# Patient Record
Sex: FEMALE | Race: WHITE
[De-identification: names, ages, dates, MRNs, and addresses within clinical notes are randomized per-mention and may not be internally consistent; named-entity substitution may affect disease eponyms.]

---

## 2019-10-11 ENCOUNTER — HOSPITAL ENCOUNTER (EMERGENCY)
Dept: HOSPITAL 62 - ER | Age: 29
Discharge: HOME | End: 2019-10-11
Payer: COMMERCIAL

## 2019-10-11 VITALS — SYSTOLIC BLOOD PRESSURE: 130 MMHG | DIASTOLIC BLOOD PRESSURE: 81 MMHG

## 2019-10-11 DIAGNOSIS — R10.13: Primary | ICD-10-CM

## 2019-10-11 DIAGNOSIS — R11.2: ICD-10-CM

## 2019-10-11 LAB
ADD MANUAL DIFF: NO
ALBUMIN SERPL-MCNC: 4.3 G/DL (ref 3.5–5)
ALP SERPL-CCNC: 76 U/L (ref 38–126)
ANION GAP SERPL CALC-SCNC: 11 MMOL/L (ref 5–19)
APPEARANCE UR: CLEAR
APTT PPP: YELLOW S
AST SERPL-CCNC: 20 U/L (ref 14–36)
BASOPHILS # BLD AUTO: 0 10^3/UL (ref 0–0.2)
BASOPHILS NFR BLD AUTO: 0.2 % (ref 0–2)
BILIRUB DIRECT SERPL-MCNC: 0 MG/DL (ref 0–0.4)
BILIRUB SERPL-MCNC: 0.9 MG/DL (ref 0.2–1.3)
BILIRUB UR QL STRIP: NEGATIVE
BUN SERPL-MCNC: 14 MG/DL (ref 7–20)
CALCIUM: 9.1 MG/DL (ref 8.4–10.2)
CHLORIDE SERPL-SCNC: 104 MMOL/L (ref 98–107)
CO2 SERPL-SCNC: 23 MMOL/L (ref 22–30)
EOSINOPHIL # BLD AUTO: 0 10^3/UL (ref 0–0.6)
EOSINOPHIL NFR BLD AUTO: 0.1 % (ref 0–6)
ERYTHROCYTE [DISTWIDTH] IN BLOOD BY AUTOMATED COUNT: 12.4 % (ref 11.5–14)
GLUCOSE SERPL-MCNC: 112 MG/DL (ref 75–110)
GLUCOSE UR STRIP-MCNC: NEGATIVE MG/DL
HCT VFR BLD CALC: 41.3 % (ref 36–47)
HGB BLD-MCNC: 14.3 G/DL (ref 12–15.5)
KETONES UR STRIP-MCNC: 80 MG/DL
LYMPHOCYTES # BLD AUTO: 0.7 10^3/UL (ref 0.5–4.7)
LYMPHOCYTES NFR BLD AUTO: 10.2 % (ref 13–45)
MCH RBC QN AUTO: 31.3 PG (ref 27–33.4)
MCHC RBC AUTO-ENTMCNC: 34.6 G/DL (ref 32–36)
MCV RBC AUTO: 91 FL (ref 80–97)
MONOCYTES # BLD AUTO: 0.2 10^3/UL (ref 0.1–1.4)
MONOCYTES NFR BLD AUTO: 3.4 % (ref 3–13)
NEUTROPHILS # BLD AUTO: 5.9 10^3/UL (ref 1.7–8.2)
NEUTS SEG NFR BLD AUTO: 86.1 % (ref 42–78)
NITRITE UR QL STRIP: NEGATIVE
PH UR STRIP: 5 [PH] (ref 5–9)
PLATELET # BLD: 307 10^3/UL (ref 150–450)
POTASSIUM SERPL-SCNC: 3.8 MMOL/L (ref 3.6–5)
PROT SERPL-MCNC: 7.7 G/DL (ref 6.3–8.2)
PROT UR STRIP-MCNC: NEGATIVE MG/DL
RBC # BLD AUTO: 4.56 10^6/UL (ref 3.72–5.28)
SP GR UR STRIP: 1.03
TOTAL CELLS COUNTED % (AUTO): 100 %
UROBILINOGEN UR-MCNC: NEGATIVE MG/DL (ref ?–2)
WBC # BLD AUTO: 6.9 10^3/UL (ref 4–10.5)

## 2019-10-11 PROCEDURE — 36415 COLL VENOUS BLD VENIPUNCTURE: CPT

## 2019-10-11 PROCEDURE — 85025 COMPLETE CBC W/AUTO DIFF WBC: CPT

## 2019-10-11 PROCEDURE — 80053 COMPREHEN METABOLIC PANEL: CPT

## 2019-10-11 PROCEDURE — 96374 THER/PROPH/DIAG INJ IV PUSH: CPT

## 2019-10-11 PROCEDURE — 83690 ASSAY OF LIPASE: CPT

## 2019-10-11 PROCEDURE — 96375 TX/PRO/DX INJ NEW DRUG ADDON: CPT

## 2019-10-11 PROCEDURE — 96361 HYDRATE IV INFUSION ADD-ON: CPT

## 2019-10-11 PROCEDURE — 76705 ECHO EXAM OF ABDOMEN: CPT

## 2019-10-11 PROCEDURE — 99284 EMERGENCY DEPT VISIT MOD MDM: CPT

## 2019-10-11 PROCEDURE — 81001 URINALYSIS AUTO W/SCOPE: CPT

## 2019-10-11 PROCEDURE — 81025 URINE PREGNANCY TEST: CPT

## 2019-10-11 NOTE — ER DOCUMENT REPORT
ED Medical Screen (RME)





- General


Chief Complaint: Abdominal Pain


Stated Complaint: DIFFICULTY BREATHING


Time Seen by Provider: 10/11/19 12:23


Primary Care Provider: 


GEORGI NGO MD [Primary Care Provider] - Follow up as needed





- [facility]


Notes: 





10/11/19 12:25


Patient is a 29-year-old female no significant past medical history or surgical 

history who presents complaining of epigastric abdominal pain that started this 

morning with associated nausea and vomiting.  She is urinating normally and 

having normal bowel movements.  Denies drug allergies.  Pain does not radiate.  

No fever.





I have treated and performed a rapid initial assessment of this patient.  A 

comprehensive ED assessment and evaluation of the patient, analysis of test 

results and completion of medical decision making process will be conducted by 

additional ED providers.





PHYSICAL EXAMINATION:





GENERAL: Well-appearing, well-nourished and in no acute distress.  





- Related Data


Allergies/Adverse Reactions: 


                                        





No Known Allergies Allergy (Unverified 10/11/19 12:23)


   











Past Medical History





- Social History


Chew tobacco use (# tins/day): No


Frequency of alcohol use: None


Drug Abuse: None





Physical Exam





- Vital signs


Vitals: 





                                        











Temp Pulse Resp BP Pulse Ox


 


 97.8 F   63   32 H  145/88 H  100 


 


 10/11/19 12:11  10/11/19 12:11  10/11/19 12:11  10/11/19 12:11  10/11/19 12:11














Course





- Vital Signs


Vital signs: 





                                        











Temp Pulse Resp BP Pulse Ox


 


 97.8 F   63   32 H  145/88 H  100 


 


 10/11/19 12:11  10/11/19 12:11  10/11/19 12:11  10/11/19 12:11  10/11/19 12:11














Doctor's Discharge





- Discharge


Referrals: 


GEORGI NGO MD [Primary Care Provider] - Follow up as needed

## 2019-10-11 NOTE — ER DOCUMENT REPORT
ED General





- General


Chief Complaint: Abdominal Pain


Stated Complaint: DIFFICULTY BREATHING


Time Seen by Provider: 10/11/19 12:23


Primary Care Provider: 


GEORGI NGO MD [EMERITUS] - Follow up in 3-5 days


Mode of Arrival: Ambulatory


Information source: Patient


Notes: 





29-year-old female presents emergency department with complaints of epigastric 

abdominal pain and nausea vomiting that started this morning.  Reports history 

of reflux.  Denies fever or diarrhea.  Denies trauma.  Denies pain with void.





- HPI


Onset: This morning


Onset/Duration: Sudden


Associated symptoms: Nausea, Vomiting


Exacerbated by: Denies


Relieved by: Denies


Similar symptoms previously: No


Recently seen / treated by doctor: No





- Related Data


Allergies/Adverse Reactions: 


                                        





No Known Allergies Allergy (Unverified 10/11/19 12:23)


   











Past Medical History





- General


Information source: Patient


Last Menstrual Period: Current





- Social History


Smoking Status: Never Smoker


Chew tobacco use (# tins/day): No


Frequency of alcohol use: None


Drug Abuse: None


Lives with: Family


Family History: None


Patient has suicidal ideation: No


Patient has homicidal ideation: No





- Medical History


Medical History: Negative


Surgical Hx: Negative





Review of Systems





- Review of Systems


Notes: 





Review HPI for review of systems., All other systems negative





Physical Exam





- Vital signs


Vitals: 


                                        











Temp Pulse Resp BP Pulse Ox


 


 97.8 F   63   32 H  145/88 H  100 


 


 10/11/19 12:11  10/11/19 12:11  10/11/19 12:11  10/11/19 12:11  10/11/19 12:11














- Notes


Notes: 





PHYSICAL EXAMINATION: 


GENERAL: Well-appearing and in no acute distress 


HEAD: Atraumatic, normocephalic. 


EYES: Pupils equal round and reactive to light, extraocular movements intact, 

sclera anicteric, conjunctiva are normal. 


ENT: nares patent, oropharynx clear without exudates. Moist mucous membranes. 


NECK: Normal range of motion, supple without lymphadenopathy 


LUNGS: CTAB and equal. No wheezes rales or rhonchi. 


HEART: Regular rate and rhythm without murmurs 


ABDOMEN: Soft, epigastric tenderness. No guarding, no rebound 


BACK: Denies pain, denies flank pain


EXTREMITIES: Normal range of motion,  


NEUROLOGICAL: Cranial nerves grossly intact.  


PSYCH: Normal mood, normal affect. 


SKIN: Warm, Dry, normal turgor, no rashes or lesions noted








- Abdominal


Inspection: Normal


Distension: No distension


Bowel sounds: Normal


Tenderness: Tender - epigastric


Organomegaly: No organomegaly





Course





- Re-evaluation


Re-evalutation: 





10/11/19 15:35


29-year-old female that woke up with abdominal pain this a.m. with some nausea 

and vomiting.  Reports she ate chicken and potatoes last night for dinner.  

Reports that she does have a history of reflux.


Labs are unremarkable , will do gallbladder ultrasound to evaluate.





gallbladder unremarkable, pt is no longer vomiting, drinking po.  The patient 

and her family were instructed on all results.  Prescribe Zofran to go.  In

structed to follow-up with primary care provider for evaluation within 1 week.  

She verbalized understanding to all instructions.











                                        





                                 10/11/19 12:40 





                                 10/11/19 12:40 





                                        











MCV  91 fl (80-97)   10/11/19  12:40    


 


MCH  31.3 pg (27.0-33.4)   10/11/19  12:40    


 


MCHC  34.6 g/dL (32.0-36.0)   10/11/19  12:40    


 


RDW  12.4 % (11.5-14.0)   10/11/19  12:40    


 


Seg Neutrophils %  86.1 % (42-78)  H  10/11/19  12:40    


 


Chloride  104 mmol/L ()   10/11/19  12:40    


 


Carbon Dioxide  23 mmol/L (22-30)   10/11/19  12:40    


 


Anion Gap  11  (5-19)   10/11/19  12:40    


 


Est GFR ( Amer)  > 60  (>60)   10/11/19  12:40    


 


Glucose  112 mg/dL ()  H  10/11/19  12:40    


 


Calcium  9.1 mg/dL (8.4-10.2)   10/11/19  12:40    


 


Total Bilirubin  0.9 mg/dL (0.2-1.3)   10/11/19  12:40    


 


AST  20 U/L (14-36)   10/11/19  12:40    


 


Alkaline Phosphatase  76 U/L ()   10/11/19  12:40    


 


Total Protein  7.7 g/dL (6.3-8.2)   10/11/19  12:40    


 


Albumin  4.3 g/dL (3.5-5.0)   10/11/19  12:40    


 


Lipase  59.8 U/L ()   10/11/19  12:40    


 


Urine Color  YELLOW   10/11/19  12:20    


 


Urine Appearance  CLEAR   10/11/19  12:20    


 


Urine pH  5.0  (5.0-9.0)   10/11/19  12:20    


 


Ur Specific Gravity  1.030   10/11/19  12:20    


 


Urine Protein  NEGATIVE mg/dL (NEGATIVE)   10/11/19  12:20    


 


Urine Glucose (UA)  NEGATIVE mg/dL (NEGATIVE)   10/11/19  12:20    


 


Urine Ketones  80 mg/dL (NEGATIVE)  H  10/11/19  12:20    


 


Urine Blood  SMALL  (NEGATIVE)  H  10/11/19  12:20    


 


Urine Nitrite  NEGATIVE  (NEGATIVE)   10/11/19  12:20    


 


Ur Leukocyte Esterase  NEGATIVE  (NEGATIVE)   10/11/19  12:20    


 


Urine WBC (Auto)  1 /HPF  10/11/19  12:20    


 


Urine RBC (Auto)  1 /HPF  10/11/19  12:20    














                                        





Abdomen Ultrasound  10/11/19 14:49


IMPRESSION:  Unremarkable right upper quadrant ultrasound.


 











10/11/19 18:22








- Vital Signs


Vital signs: 


                                        











Temp Pulse Resp BP Pulse Ox


 


 98.0 F   64   32 H  130/81 H  100 


 


 10/11/19 17:02  10/11/19 17:02  10/11/19 12:11  10/11/19 17:02  10/11/19 17:02














- Laboratory


Result Diagrams: 


                                 10/11/19 12:40





                                 10/11/19 12:40


Laboratory results interpreted by me: 


                                        











  10/11/19 10/11/19 10/11/19





  12:20 12:40 12:40


 


Lymph % (Auto)   10.2 L 


 


Seg Neutrophils %   86.1 H 


 


Glucose    112 H


 


Urine Ketones  80 H  


 


Urine Blood  SMALL H  














- Diagnostic Test


Radiology reviewed: Image reviewed, Reports reviewed





Discharge





- Discharge


Clinical Impression: 


Abdominal pain


Qualifiers:


 Abdominal location: epigastric Qualified Code(s): R10.13 - Epigastric pain





Nausea & vomiting


Qualifiers:


 Vomiting type: unspecified Vomiting Intractability: non-intractable Qualified 

Code(s): R11.2 - Nausea with vomiting, unspecified





Disposition: HOME, SELF-CARE


Instructions:  Abdominal Pain ([facility]), Antinausea Medication ([facility]), Vomiting ([facility])


Additional Instructions: 


*You have been evaluated for abdominal pain, nausea and vomiting


*Here ultrasound was negative for gallstones, your work-up was unremarkable


*Take medication as prescribed for nausea


*Follow up with a primary care provider within one week for recheck


*Return to ED for worsening condition, changes, needs, concerns increasing abd

ominal pain


*Return to ED if not better in 24 hours














Monitor your blood pressure. Your blood pressure was elevated today.  This may 

be because you were anxious, in pain or because you need medication.  It is 

important to follow up with your primary care provider for full evaluation.


Forms:  Elevated Blood Pressure


Referrals: 


GEORGI NGO MD [EMERITUS] - Follow up in 3-5 days

## 2019-10-11 NOTE — RADIOLOGY REPORT (SQ)
EXAM DESCRIPTION:  U/S ABDOMEN LIMITED W/O DOP



COMPLETED DATE/TIME:  10/11/2019 4:16 pm



REASON FOR STUDY:  epigastric pain



COMPARISON:  None.



TECHNIQUE:  Dynamic and static grayscale images acquired of the abdomen and recorded on PACS. Additio
nal selected color Doppler and spectral images recorded.



LIMITATIONS:  None.



FINDINGS:  PANCREAS: No masses.  Visualized pancreatic duct normal caliber.

LIVER: No masses. Echotexture normal.

LIVER VASCULATURE: Normal directional flow of the main portal vein and hepatic veins.

GALLBLADDER: No stones. Normal wall thickness. No pericholecystic fluid.

ULTRASOUND-DETECTED HUGHES'S SIGN: Negative.

INTRAHEPATIC DUCTS AND COMMON DUCT: CBD and intrahepatic ducts normal caliber. No filling defects.

INFERIOR VENA CAVA: Normal flow.

AORTA: No aneurysm.

RIGHT KIDNEY:  Normal size measuring 11.1 cm. Normal echogenicity. No solid or suspicious masses. No 
hydronephrosis. No calcifications.

PERITONEAL AND RIGHT PLEURAL SPACE: No ascites or effusions.

OTHER: No other significant findings.



IMPRESSION:  Unremarkable right upper quadrant ultrasound.



TECHNICAL DOCUMENTATION:  JOB ID:  [id number]

 2011 [employer]- All Rights Reserved



Reading location - IP/workstation name: [email or web address]